# Patient Record
Sex: MALE | Race: WHITE | NOT HISPANIC OR LATINO | Employment: OTHER | URBAN - METROPOLITAN AREA
[De-identification: names, ages, dates, MRNs, and addresses within clinical notes are randomized per-mention and may not be internally consistent; named-entity substitution may affect disease eponyms.]

---

## 2018-10-23 ENCOUNTER — HOSPITAL ENCOUNTER (EMERGENCY)
Facility: MEDICAL CENTER | Age: 43
End: 2018-10-23
Attending: EMERGENCY MEDICINE

## 2018-10-23 VITALS
BODY MASS INDEX: 32.86 KG/M2 | SYSTOLIC BLOOD PRESSURE: 155 MMHG | TEMPERATURE: 97.9 F | HEIGHT: 70 IN | OXYGEN SATURATION: 94 % | WEIGHT: 229.5 LBS | DIASTOLIC BLOOD PRESSURE: 125 MMHG | RESPIRATION RATE: 18 BRPM | HEART RATE: 58 BPM

## 2018-10-23 DIAGNOSIS — I83.899 BLEEDING FROM VARICOSE VEIN: ICD-10-CM

## 2018-10-23 PROCEDURE — 700101 HCHG RX REV CODE 250: Performed by: EMERGENCY MEDICINE

## 2018-10-23 PROCEDURE — A9270 NON-COVERED ITEM OR SERVICE: HCPCS | Performed by: EMERGENCY MEDICINE

## 2018-10-23 PROCEDURE — 99284 EMERGENCY DEPT VISIT MOD MDM: CPT

## 2018-10-23 PROCEDURE — 700102 HCHG RX REV CODE 250 W/ 637 OVERRIDE(OP): Performed by: EMERGENCY MEDICINE

## 2018-10-23 RX ORDER — LIDOCAINE HYDROCHLORIDE AND EPINEPHRINE 10; 10 MG/ML; UG/ML
5 INJECTION, SOLUTION INFILTRATION; PERINEURAL ONCE
Status: COMPLETED | OUTPATIENT
Start: 2018-10-23 | End: 2018-10-23

## 2018-10-23 RX ORDER — FUROSEMIDE 40 MG/1
40 TABLET ORAL DAILY
Qty: 30 TAB | Refills: 0 | Status: SHIPPED | OUTPATIENT
Start: 2018-10-23

## 2018-10-23 RX ORDER — LORAZEPAM 1 MG/1
1 TABLET ORAL ONCE
Status: COMPLETED | OUTPATIENT
Start: 2018-10-23 | End: 2018-10-23

## 2018-10-23 RX ADMIN — LORAZEPAM 1 MG: 1 TABLET ORAL at 22:16

## 2018-10-23 RX ADMIN — LIDOCAINE HYDROCHLORIDE AND EPINEPHRINE 5 ML: 10; 10 INJECTION, SOLUTION INFILTRATION; PERINEURAL at 22:15

## 2018-10-24 NOTE — ED TRIAGE NOTES
Chief Complaint   Patient presents with   • Bleeding/Bruising       Patient states that he picked at varicose vein on his left lower leg and it started bleeding. Patient present with 1 towel soaked in blood. On assessment blood squirted out at RN. Pressure applied.  Charge updated and patient roomed.

## 2018-10-24 NOTE — ED NOTES
Checked patients wound. Still spurting blood. Re-dressed wound with nonadherent dressing and Compression. MD notified.

## 2018-10-24 NOTE — ED NOTES
Hourly rounding performed. Assessed patient complaints and Bathroom/comfort needs.   Pt resting comfortably

## 2018-10-24 NOTE — PROGRESS NOTES
Patient is being discharged from ED to home. Discharge instructions were discussed by RN with patient and/or Family. No questions at this time. Medications were discussed with patient. VS within normal limits or have been addressed with patient and MD.     Discussed follow-up and establishing PCP

## 2018-10-24 NOTE — ED NOTES
Reports that he has a Hx of Varicose veins, not on blood thinners. Was picking at a scab today on his RLE, significant pulsing bleeding. Triage put compression dressing on the Wound, bleeding controlled.

## 2018-10-24 NOTE — ED PROVIDER NOTES
"ED Provider Note    CHIEF COMPLAINT  Chief Complaint   Patient presents with   • Bleeding/Bruising       HPI  Peter Lubin is a 43 y.o. male who presents with bleeding from a small puncture wound to the right lower extremity.  The patient states this started prior to arrival.  He was picking at a lesion when the bleeding started.  He does have some pulsating blood coming from a small puncture wound to the right lower extremity.  He does have brawny changes to the right lower extremity that is not acute.  He does not have any significant edema.  He does not take any anticoagulants.  He has not had any associated fevers.  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    REVIEW OF SYSTEMS  No dizziness    PHYSICAL EXAM  VITAL SIGNS: BP (!) 184/141   Pulse (!) 142   Temp 36.6 °C (97.9 °F) (Temporal)   Resp 20   Ht 1.778 m (5' 10\")   Wt 104.1 kg (229 lb 8 oz)   SpO2 95%   BMI 32.93 kg/m²   In general the patient is extremely anxious but nontoxic  Extremities no skeletal deformities.  The patient does have brawny changes to the bilateral lower extremities.  He has a arterial bleed to the medial aspect of the right leg.  The patient does have good distal pulses.  Skin a small puncture wound to the right medial aspect of the lower extremity with pulsating hemorrhage  Neurovascular examination is intact to the right lower extremity    COURSE & MEDICAL DECISION MAKING  Pertinent Labs & Imaging studies reviewed. (See chart for details)  This a 43-year-old male who presents the emergency department with a bleeding varicose vein to the right lower extremity.  Initially applied pressure and this was not effective.  Therefore I injected the area with lidocaine and epinephrine.  I then placed gauze soaked with lidocaine and epinephrine on the wound and applied a pressure dressing.  After approximately 30 minutes of pressure the bleeding " has resolved.  The patient was significantly tachycardic and hypertensive on arrival.  He continues to adamantly deny further amphetamine or cocaine abuse.  He states he is just very anxious.  The patient did receive Ativan orally and this did help bring his blood pressure and his pulse down.  His bleeding is stopped.  The patient's instructed continue to refrain from any further alcohol or drug abuse.  He will apply pressure for any further bleeding and return if this is not effective.  The patient is aware he needs to follow-up with the primary care provider in 1 week for routine health maintenance and recheck of his blood pressure.    FINAL IMPRESSION  1.  Bleeding varicose vein right lower extremity  2.  Hypertension and tachycardia suspect secondary to amphetamine abuse versus anxiety      Disposition  The patient will be discharged in stable condition.      Electronically signed by: Dave Russell, 10/23/2018 10:20 PM    At the time of discharge I spoke with the patient and he states that he supposed to be taking a water pill for his blood pressure he is unaware of the name.  I will start the patient on Lasix and he will go to the [facility] for further treatment.